# Patient Record
Sex: MALE | Race: WHITE | Employment: UNEMPLOYED | URBAN - METROPOLITAN AREA
[De-identification: names, ages, dates, MRNs, and addresses within clinical notes are randomized per-mention and may not be internally consistent; named-entity substitution may affect disease eponyms.]

---

## 2019-04-10 ENCOUNTER — HOSPITAL ENCOUNTER (EMERGENCY)
Facility: CLINIC | Age: 34
Discharge: HOME | End: 2019-04-10
Attending: EMERGENCY MEDICINE | Admitting: EMERGENCY MEDICINE
Payer: MEDICAID

## 2019-04-10 VITALS
DIASTOLIC BLOOD PRESSURE: 82 MMHG | SYSTOLIC BLOOD PRESSURE: 131 MMHG | OXYGEN SATURATION: 100 % | BODY MASS INDEX: 25.76 KG/M2 | RESPIRATION RATE: 18 BRPM | TEMPERATURE: 98.5 F | HEIGHT: 71 IN | WEIGHT: 184 LBS

## 2019-04-10 DIAGNOSIS — R45.1 AGITATION: ICD-10-CM

## 2019-04-10 LAB
ALCOHOL BREATH TEST: 0.06 (ref 0–0.01)
AMPHETAMINES UR QL SCN: NEGATIVE
BARBITURATES UR QL: NEGATIVE
BENZODIAZ UR QL: NEGATIVE
CANNABINOIDS UR QL SCN: NEGATIVE
COCAINE UR QL: NEGATIVE
OPIATES UR QL SCN: NEGATIVE
PCP UR QL SCN: NEGATIVE

## 2019-04-10 PROCEDURE — 80307 DRUG TEST PRSMV CHEM ANLYZR: CPT | Performed by: EMERGENCY MEDICINE

## 2019-04-10 PROCEDURE — 99285 EMERGENCY DEPT VISIT HI MDM: CPT | Mod: 25

## 2019-04-10 PROCEDURE — 82075 ASSAY OF BREATH ETHANOL: CPT

## 2019-04-10 PROCEDURE — 90791 PSYCH DIAGNOSTIC EVALUATION: CPT

## 2019-04-10 RX ORDER — CHLORAL HYDRATE 500 MG
2 CAPSULE ORAL DAILY
COMMUNITY

## 2019-04-10 RX ORDER — MULTIVITAMIN WITH IRON
1 TABLET ORAL DAILY
COMMUNITY

## 2019-04-10 RX ORDER — SERTRALINE HYDROCHLORIDE 100 MG/1
100 TABLET, FILM COATED ORAL DAILY
COMMUNITY

## 2019-04-10 RX ORDER — RIBOFLAVIN (VITAMIN B2) 100 MG
100 TABLET ORAL 3 TIMES DAILY
COMMUNITY

## 2019-04-10 RX ORDER — MULTIVIT-MIN/IRON/FOLIC ACID/K 18-600-40
CAPSULE ORAL
COMMUNITY

## 2019-04-10 ASSESSMENT — ENCOUNTER SYMPTOMS
ABDOMINAL PAIN: 0
DIARRHEA: 0
DYSURIA: 0

## 2019-04-10 ASSESSMENT — MIFFLIN-ST. JEOR: SCORE: 1801.75

## 2019-04-10 NOTE — ED NOTES
Assumed care of patient. Patient resting with eyes closed on stretcher. Breaths even and unlabored

## 2019-04-10 NOTE — ED TRIAGE NOTES
"Pt arrives via EMS for psych evaluation of increasing Homicidal ideation. Pt reported to EMS that he has had these thoughts for an extended period but has started to \"tell his family\" about them for the last month. Pt lives with parents, parents are afraid for their safety and told EMS have attempted to remove any potential hazards from their home. Pt was sending his sister text messages earlier this evening of pictures and statements of harming children and other people. Pt had a plan to \"start chopping heads off\" or \"start throwing people in a big fire.\" Pt sister reported these messages at PD, who followed up with a visit and called EMS for transport of pt. Pt was voluntary to come to ED, reports drinking a full bottle of wine last night. States to staff \"these people just need to pay, no one is nice, I hate everyone.\"  "

## 2019-04-10 NOTE — ED PROVIDER NOTES
"  History     Chief Complaint:    Homicidal       HPI   Jean Contreras is a 34 year old male who presents to the ED via EMS for evaluation of homicidal ideation. The patient states that he has a longstanding history of mental and emotional stress as he is not \"neurotypical.\" He states that \"nobody understands me\" and he has been bullied throughout his entire life at school, work, and by his family, particularly his father. As a result, he states that he has had intermittent \"obsseive violent thoughts\" since he was a child. He reports that his primary motivation for these thoughts is that he wants other people to \"feel my pain.\" He states that he has only ever been emotionally aggressive and has never physically harmed anyone and does not express an overt desire to physically harm anyone. Tonight, he states that he texted aggressive text messages to his sister who subsequently called the police. Police had EMS bring him to the ED for further evaluation. He also cites several other social stressors, primarily being fired from two jobs. He was going to schedule an appointment with a therapist but he no longer has insurance as he lost his job. Because it was his birthday yesterday, he states that he had a bottle of wine throughout the night. He denies any suicidal ideation, abdominal pain, diarrhea, dysuria, or physical symptoms. Of note, the patient states his parents have hid the knives in his house because of his homicidal thoughts but he is certain that he would not inflict bodily harm on anyone. He did, however, state that \"people that aren't neurotypical like me shoot up schools and become mass murders.\"     Allergies:  The patient has no known drug allergies.    Medications:    The patient is currently on no regular medications.    Past Medical History:    The patient denies any significant past medical history.    Past Surgical History:    The patient does not have any pertinent past surgical " "history.    Family History:    No past pertinent family history.    Social History:  Positive for alcohol use.   Negative for tobacco use.  No guns in his home. Parents, whom he lives with, have hidden all of the knives in the house.  Marital Status:  Single     Review of Systems   Gastrointestinal: Negative for abdominal pain and diarrhea.   Genitourinary: Negative for dysuria.   Psychiatric/Behavioral: Negative for suicidal ideas.        Homicidal ideation and tearful   All other systems reviewed and are negative.      Physical Exam   First Vitals:  BP: 131/82  Heart Rate: 90  Temp: 98.5  F (36.9  C)  Resp: 18  Height: 180.3 cm (5' 11\")  Weight: 83.5 kg (184 lb)  SpO2: 100 %      Physical Exam  Constitutional: Well developed, nontox appearance  Head: Atraumatic.   Mouth/Throat: Oropharynx is clear and moist.   Neck:  no stridor  Eyes: no scleral icterus  Cardiovascular: RRR, 2+ bilat radial pulses  Pulmonary/Chest: nml resp effort, Clear BS bilat  Abdominal: ND, +BS, soft, NT, no rebound or guarding   : no CVA tenderness bilat  Ext: Warm, well perfused, no edema  Neurological: A&O, symmetric facies, moves ext x4  Skin: Skin is warm and dry.   Psychiatric: tearful, intermittently sobbing.  HI, denies SI.  Does not appear to responding to internal stimuli.  Nursing note and vitals reviewed.    Emergency Department Course   Laboratory:  Drug abuse screen: all negative  CT: 0.056    Emergency Department Course:  Nursing notes and vitals reviewed. (0105) I performed an exam of the patient as documented above.     The patient provided a urine sample here in the emergency department. This was sent for laboratory testing, findings above.     Patient will be assessed by DEC in the morning.      Patient signed out to Dr. Garcia with final disposition pending in the morning.        Impression & Plan    Medical Decision Makin y.o. M presenting with homicidal thoughts     Differential diagnosis includes antisocial " personality disorder, personality disorder NOS, depression, anxiety and substance abuse although the patient denies illegal drug use or significant alcohol use.  Labs sig for etoh breath test <0.08.  The patient may benefit from psychiatric hospitalization given his homicidal ideations, lack of resources or support system, possible antisocial personality disorder the patient is potentially agreeable to stay in the hospital and seems to be motivated to find help.  DEC evaluation was requested in the emergency department but will not occur until AM. Pt signed out to oncoming team in stable condition.  Pt may likely be appropriate for outpt mgmt with safety plan and f/u arranged.      Diagnosis:    ICD-10-CM    1. Agitation R45.1        Disposition:  Signed out to Dr. Jose Gudino Disclosure:  I,  Isma Luna, am serving as a scribe on 4/10/2019 at 1:05 AM to personally document services performed by Judd Philip MD based on my observations and the provider's statements to me.        Isma Luna  4/10/2019    EMERGENCY DEPARTMENT       Judd Philip MD  04/16/19 [id number]

## 2019-04-10 NOTE — ED PROVIDER NOTES
"Patient signed out to myself.  Tele-dec made assessment and spoke with patients father as well.  Discharge with resources were suggested.  I met with the patient as well, he denies homicidal or suicidal thoughts or plans.  He does not have access to guns.  He lives with his parents and they are comfortable with him coming home.  He notes he has plans for future job interviews.  In terms of the \"I am neuro-atypical\" type statement, he describes himself as having ADD/ADHD and never has been understood.  He looks safe for discharge.       Joe Garcia MD  04/10/19 0952    "

## 2019-04-10 NOTE — ED AVS SNAPSHOT
Emergency Department  [address] [facility] [address]  Phone:  [phone]  Fax:  [phone]                                    Jean Contreras   MRN: [id number]    Department:   Emergency Department   Date of Visit:  4/10/2019           After Visit Summary Signature Page    I have received my discharge instructions, and my questions have been answered. I have discussed any challenges I see with this plan with the nurse or doctor.    ..........................................................................................................................................  Patient/Patient Representative Signature      ..........................................................................................................................................  Patient Representative Print Name and Relationship to Patient    ..................................................               ................................................  Date                                   Time    ..........................................................................................................................................  Reviewed by Signature/Title    ...................................................              ..............................................  Date                                               Time          22EPIC Rev 08/18

## 2019-04-10 NOTE — ED NOTES
DEC contacted, they report it will be around 3 hours until they can assess patient. ER MD and patient updated.